# Patient Record
Sex: FEMALE | Race: BLACK OR AFRICAN AMERICAN
[De-identification: names, ages, dates, MRNs, and addresses within clinical notes are randomized per-mention and may not be internally consistent; named-entity substitution may affect disease eponyms.]

---

## 2018-11-17 NOTE — PHYS DOC
Past Medical History


Past Medical History:  No Pertinent History


Past Surgical History:  No Surgical History


Alcohol Use:  None


Drug Use:  Marijuana





Adult General


Chief Complaint


Chief Complaint:  MUSCLE SPASM/CRAMP





HPI


HPI





Patient is a 22  year old female who presents with numbness in the toes.  

Patient has been having symptoms chronically. She cannot identify any 

aggravating or alleviating factors. She describes episodes of numbness and 

cramps in the toes bilaterally with the right being worse and more frequent 

than the left. Symptoms are worse sometimes at night. Patient states she does 

wear loose shoes at baseline. She did not sustain any injury. She has no 

additional complaints over the rest of her body.





Review of Systems


Review of Systems





Constitutional: Denies fever 


Eyes: Denies change in visual acuity


HENT: Denies nasal congestion


Respiratory: Denies 


: Denies 


Musculoskeletal: Denies back pain 


Integument: Denies rash or skin lesions


Neurologic: Denies headache


Endocrine: Denies polyuria





All other systems were reviewed and found to be within normal limits, except as 

documented in this note.





Current Medications


Current Medications





Current Medications








 Medications


  (Trade)  Dose


 Ordered  Sig/Pasha  Start Time


 Stop Time Status Last Admin


Dose Admin


 


 Acetaminophen/


 Hydrocodone Bitart


  (Lortab 5/325)  2 tab  1X  ONCE  11/17/18 05:30


 11/17/18 05:31 UNV  





 


 Ibuprofen


  (Motrin)  800 mg  1X  ONCE  11/17/18 05:30


 11/17/18 05:31 UNV  














Allergies


Allergies





Allergies








Coded Allergies Type Severity Reaction Last Updated Verified


 


  No Known Drug Allergies    4/5/14 No











Physical Exam


Physical Exam





Constitutional: Well developed, well nourished, no acute distress, non-toxic 

appearance


HENT: Normocephalic, atraumatic, bilateral external ears normal, oropharynx 

moist


Eyes: PERRLA, EOMI, conjunctiva normal 


Neck: Normal range of motion  


Skin: Warm, dry, no erythema, no rash 


Back: No tenderness 


Extremities: Normal examination of the bilateral feet but + exquisite TTP b/w 

2nd and 3rd toes over plantar surface.  Sensation to light touch intact and 5/5 

motor strength of feet/toes 


Neurologic: Alert and oriented X 3


Psychologic: Affect normal





Current Patient Data


Vital Signs





 Vital Signs








  Date Time  Temp Pulse Resp B/P (MAP) Pulse Ox O2 Delivery O2 Flow Rate FiO2


 


11/17/18 05:03 98.3 101 16 148/99 (115) 95 Room Air  





 98.3       











EKG


EKG


[]





Radiology/Procedures


Radiology/Procedures


[]





Course & Med Decision Making


Course & Med Decision Making


Pertinent Labs and Imaging studies reviewed. (See chart for details)





 is evaluated for some paresthesias and cramping of the toes which has 

been chronic but was worse overnight this evening so she came to the ER. 

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 

neuroma. I cannot diagnose this in the emergency department as MRI would be 

necessary. Patient does not have insurance. She is provided a list of local 

clinics that she could try to establish care. She is advised that a podiatrist 

could possibly give some injections or further evaluate her likely diagnosis. 

She is also recommended to follow-up at the [employer] store as there are 

sometimes inserts which can be of benefit with this diagnosis. She is provided 

mode back to take 15 mg daily. She is provided some Norco to use at night only 

to help her sleep and with the pain. Patient is discharged home. She is 

agreeable to the plan of care. Opiate precautions are discussed. Return to the 

ER for any new or worsening symptoms.





Dragon Disclaimer


Dragon Disclaimer


This electronic medical record was generated, in whole or in part, using a 

voice recognition dictation system.





Departure


Departure


Impression:  


 Primary Impression:  


 Barnes neuroma


Disposition:  01 HOME, SELF-CARE


Condition:  GOOD


Patient Instructions:  Barnes's Neuroma (Interdigital Plantar Neuroma)-[facility]





Additional Instructions:  


You have a Barnes's Neuroma.  The best treatment for you is evaluation by a 

podiatrist.  You may also benefit though from going to the "[employer]" store as 

they may have some inserts/alternatives which may be of benefit to you.


Scripts


Hydrocodone/Apap 5-325 (NORCO 5-325 TABLET) 1 Each Tablet


1-2 EACH PO QHS PRN for SEVERE PAIN, #30


   as needed for pain


   Prov: RAIZA CROSS DO         11/17/18 


Meloxicam (MOBIC) 15 Mg Tablet


15 MG PO DAILY for 30 Days, #30 TAB 3 Refills


   Prov: RAIZA CROSS DO         11/17/18











RAIZA CROSS DO Nov 17, 2018 05:36

## 2019-06-09 NOTE — PHYS DOC
Past Medical History


Past Medical History:  No Pertinent History


Past Surgical History:  Other


Additional Past Surgical Histo:  RIGHT ELBOW SURG.


Alcohol Use:  None


Drug Use:  Marijuana





Adult General


Chief Complaint


Chief Complaint:  DENTAL PROBLEM





HPI


HPI





23-year-old female presents with dental pain. She states it's been progressive 

over the last several days. She states the pain is currently 10 out of 10. She 

is unable to get comfortable or even rest. She states cold makes it much 

worse.[]





Review of Systems


Review of Systems








HENT: Per history of present illness[]








All other systems were reviewed and found to be within normal limits, except as 

documented in this note.





Current Medications


Current Medications





Current Medications








 Medications


  (Trade)  Dose


 Ordered  Sig/Pasha  Start Time


 Stop Time Status Last Admin


Dose Admin


 


 Acetaminophen/


 Hydrocodone Bitart


  (Lortab 5/325)  2 tab  1X  ONCE  6/9/19 00:30


 6/9/19 00:31   














Allergies


Allergies





Allergies








Coded Allergies Type Severity Reaction Last Updated Verified


 


  No Known Drug Allergies    4/5/14 No











Physical Exam


Physical Exam





Constitutional: Well developed, well nourished, no acute distress, non-toxic 

appearance. []


HENT: Her teeth are carious but I do not appreciate any fracture no surrounding 

gingival erythema or abscess. []


Eyes: PERRLA, EOMI, conjunctiva normal, no discharge. [] 


Neck: Normal range of motion, no tenderness, supple, no stridor. [] 


Cardiovascular:Heart rate regular rhythm, no murmur []


Lungs & Thorax:  Bilateral breath sounds clear to auscultation []


Abdomen: Bowel sounds normal, soft, no tenderness, no masses, no pulsatile 

masses. [] 


Skin: Warm, dry, no erythema, no rash. [] 


Psychologic: Anxious very tearful[]





Current Patient Data


Vital Signs





                                   Vital Signs








  Date Time  Temp Pulse Resp B/P (MAP) Pulse Ox O2 Delivery O2 Flow Rate FiO2


 


6/8/19 23:44 98.1 56 18 145/94 (111) 98 Room Air  





 98.1       











EKG


EKG


[]





Radiology/Procedures


Radiology/Procedures


[]





Course & Med Decision Making


Course & Med Decision Making


Pertinent Labs and Imaging studies reviewed. (See chart for details)





[]





Dragon Disclaimer


Dragon Disclaimer


This electronic medical record was generated, in whole or in part, using a voice

 recognition dictation system.





Departure


Departure


Impression:  


   Primary Impression:  


   Pain, dental


   Additional Impression:  


   Pain due to dental caries


Disposition:  01 HOME, SELF-CARE


Condition:  STABLE


Referrals:  


NO PCP (PCP)


Patient Instructions:  Dental Caries





Additional Instructions:  


He need to follow with a dentist within the next 2-3 days.


Scripts


Penicillin V Potassium (PENICILLIN V POTASSIUM) 500 Mg Tablet


1 TAB PO QID, #40 TAB


   Prov: GARY STEVENSON DO         6/9/19 


Hydrocodone/Apap 5-325 (NORCO 5-325 TABLET) 1 Each Tablet


1 TAB PO PRN Q6HRS PRN for PAIN, #12 TAB 0 Refills


   Prov: GARY STEVENSON DO         6/9/19





Problem Qualifiers











GARY STEVENSON DO              Jun 9, 2019 00:12

## 2019-07-11 NOTE — PHYS DOC
Past Medical History


Past Medical History:  No Pertinent History


Past Surgical History:  Other


Additional Past Surgical Histo:  RIGHT ELBOW SURG.


Additional Information:  


7 CIGS/DAY


Alcohol Use:  None


Drug Use:  Marijuana





Adult General


Chief Complaint


Chief Complaint:  FACE PROBLEM





HPI


HPI





Patient is a 23  year old female who presents with right-sided throat soreness 

with right lower swollen lymph node that started today. Patient denies recent 

illness or fever. Patient rates her pain a 7 out of 10.





Review of Systems


Review of Systems





Constitutional: Denies fever or chills []


Eyes: Denies change in visual acuity, redness, or eye pain []


HENT: Denies nasal congestion. sore throat []


Respiratory: Denies cough or shortness of breath []


Cardiovascular: No additional information not addressed in HPI []


GI: Denies abdominal pain, nausea, vomiting, bloody stools or diarrhea []


: Denies dysuria or hematuria []


Musculoskeletal: Denies back pain or joint pain []


Integument: Denies rash or skin lesions []


Neurologic: Denies headache, focal weakness or sensory changes []


Endocrine: Denies polyuria or polydipsia []





All other systems were reviewed and found to be within normal limits, except as 

documented in this note.





Current Medications


Current Medications





Current Medications








 Medications


  (Trade)  Dose


 Ordered  Sig/Pasha  Start Time


 Stop Time Status Last Admin


Dose Admin


 


 Ibuprofen


  (Motrin)  600 mg  1X  ONCE  7/11/19 15:45


 7/11/19 15:46 DC 7/11/19 15:46


600 MG











Allergies


Allergies





Allergies








Coded Allergies Type Severity Reaction Last Updated Verified


 


  No Known Drug Allergies    4/5/14 No











Physical Exam


Physical Exam





Constitutional: Well developed, well nourished, no acute distress, non-toxic 

appearance. []


HENT: Normocephalic, atraumatic, bilateral external ears normal, oropharynx 

moist, no oral exudates, nose normal. Right sided submandibular lymph node 

edema.[]


Eyes: PERRLA, EOMI, conjunctiva normal, no discharge. [] 


Neck: Normal range of motion, no tenderness, supple, no stridor. [] 


Cardiovascular:Heart rate regular rhythm, no murmur []


Lungs & Thorax:  Bilateral breath sounds clear to auscultation []


Abdomen: Bowel sounds normal, soft, no tenderness, no masses, no pulsatile 

masses. [] 


Skin: Warm, dry, no erythema, no rash. [] 


Back: No tenderness, no CVA tenderness. [] 


Extremities: No tenderness, no cyanosis, no clubbing, ROM intact, no edema. [] 


Neurologic: Alert and oriented X 3, normal motor function, normal sensory 

function, no focal deficits noted. []


Psychologic: Affect normal, judgement normal, mood normal. []





Current Patient Data


Vital Signs





                                   Vital Signs








  Date Time  Temp Pulse Resp B/P (MAP) Pulse Ox O2 Delivery O2 Flow Rate FiO2


 


7/11/19 15:33 99.2 78 18 143/85 (104) 97 Room Air  





 99.2       











EKG


EKG


[]





Radiology/Procedures


Radiology/Procedures


[]





Course & Med Decision Making


Course & Med Decision Making


Patient is a 23  year old female who presents with right-sided throat soreness 

with right lower swollen lymph node that started today. Patient denies recent 

illness or fever. Patient rates her pain a 7 out of 10. Speaks in full clear 

sentences. Patient has no trouble breathing or swallowing and she is not 

drooling. Patient denies shortness of breath, chest pain, recent illness, 

nausea, vomiting, fever. Throat is reddened but there is no swelling or 

exudates. Lungs are clear to auscultation all lobes. Bilateral tympanic 

membranes are pearly white. Patient denies any nasal congestion or cough. Skin 

pink warm and dry. Mucous membranes are moist. Rapid strep is negative. Right 

sided submandibular lymph node swelling and tenderness with palpation.





I did with Dr Carreon on this patient. Patiently put on a Medrol Dosepak and to f

ollow-up with her primary care if needed. Patient is to return to the ER 

immediately if she starts having increased pain, fever, cannot swallow, 

shortness of air, drooling.





Dragon Disclaimer


Dragon Disclaimer


This electronic medical record was generated, in whole or in part, using a voice

 recognition dictation system.





Departure


Departure


Impression:  


   Primary Impression:  


   Throat pain in adult


Disposition:  01 HOME, SELF-CARE


Condition:  STABLE


Referrals:  


NO PCP (PCP)


Patient Instructions:  Sore Throat





Additional Instructions:  


Return to the ED for inability to swallow, drooling, fever, increased pain. Take

 medication as prescribed.


Scripts


Methylprednisolone (MEDROL) 4 Mg Tab.ds.pk


1 PKG PO UD, #1 PKG


   Prov: VIANNEY FUENTES APRN         7/11/19











VIANNEY FUENTES            Jul 11, 2019 16:04

## 2020-09-09 ENCOUNTER — HOSPITAL ENCOUNTER (EMERGENCY)
Dept: HOSPITAL 61 - ER | Age: 25
Discharge: HOME | End: 2020-09-09
Payer: SELF-PAY

## 2020-09-09 VITALS — HEIGHT: 66 IN | BODY MASS INDEX: 31.89 KG/M2 | WEIGHT: 198.42 LBS

## 2020-09-09 VITALS — SYSTOLIC BLOOD PRESSURE: 162 MMHG | DIASTOLIC BLOOD PRESSURE: 107 MMHG

## 2020-09-09 DIAGNOSIS — F17.200: ICD-10-CM

## 2020-09-09 DIAGNOSIS — F12.90: ICD-10-CM

## 2020-09-09 DIAGNOSIS — R20.0: ICD-10-CM

## 2020-09-09 DIAGNOSIS — M25.521: Primary | ICD-10-CM

## 2020-09-09 DIAGNOSIS — Z98.890: ICD-10-CM

## 2020-09-09 PROCEDURE — 73080 X-RAY EXAM OF ELBOW: CPT

## 2020-09-09 PROCEDURE — 99283 EMERGENCY DEPT VISIT LOW MDM: CPT

## 2020-09-09 NOTE — PHYS DOC
Past Medical History


Past Medical History:  No Pertinent History


Past Surgical History:  Other


Additional Past Surgical Histo:  RIGHT ELBOW SURG.


Smoking Status:  Current Every Day Smoker


Additional Information:  


6 CIG/DAY


Alcohol Use:  None


Drug Use:  Marijuana





General Adult


EDM:


Chief Complaint:  UPPER EXTREMITY PAIN





HPI:


HPI:





Patient is a 24  year old female who had right elbow surgery 1 year ago and for 

the last week or so she has had intermittent pain and swelling to the right 

elbow.  Patient denies any recent trauma.  Patient denies any fever.  Patient 

says the pain is worse when she wakes up in the morning.  Patient states the 

pain is localized around the olecranon and radiates outward from there.  Pain is

moderate in severity.





Review of Systems:


Review of Systems:


Constitutional:   Denies fever or chills. []


Eyes:   Denies change in visual acuity. []


HENT:   Denies nasal congestion or sore throat. [] 


Respiratory:   Denies cough or shortness of breath. [] 


Cardiovascular:   Denies chest pain or edema. [] 


GI:   Denies abdominal pain, nausea, vomiting, bloody stools or diarrhea. [] 


:  Denies dysuria. [] 


Musculoskeletal:   Denies back pain but has right right elbow pain


Integument:   Denies rash. [] 


Neurologic:   Denies headache, focal weakness or sensory changes. [] 


Endocrine:   Denies polyuria or polydipsia. [] 


Lymphatic:  Denies swollen glands. [] 


Psychiatric:  Denies depression or anxiety. []





Heart Score:


Risk Factors:


Risk Factors:  DM, Current or recent (<one month) smoker, HTN, HLP, family 

history of CAD, obesity.


Risk Scores:


Score 0 - 3:  2.5% MACE over next 6 weeks - Discharge Home


Score 4 - 6:  20.3% MACE over next 6 weeks - Admit for Clinical Observation


Score 7 - 10:  72.7% MACE over next 6 weeks - Early Invasive Strategies





Allergies:


Allergies:





Allergies








Coded Allergies Type Severity Reaction Last Updated Verified


 


  No Known Drug Allergies    14 No











Physical Exam:


PE:





Constitutional: Well developed, well nourished, no acute distress, non-toxic 

appearance. []


HENT: Normocephalic, atraumatic, bilateral external ears normal, no trismus, 

nose normal. []


Eyes: PERRLA, EOMI, conjunctiva normal, no discharge. [] 


Neck: Normal range of motion, no tenderness, supple, no stridor. [] 


Cardiovascular:Heart rate regular rhythm, peripheral pulses intact, cap refill 

brisk


Lungs & Thorax:  Bilateral breath sounds clear, no respiratory distress


Abdomen: , soft, no tenderness, no masses, no pulsatile masses. [] 


Skin: Warm, dry, no erythema, no rash. [] 


Back: No tenderness, no CVA tenderness. [] 


Extremities: Scar to the right elbow, mild tenderness with mild limited range of

 motion with minimal swelling no erythema or warmth, neurovascular intact 

distally


Neurologic: Alert and oriented X 3, normal motor function, normal sensory 

function, no focal deficits noted. []


Psychologic: Affect normal, judgement normal, mood normal. []





Current Patient Data:


Vital Signs:





                                   Vital Signs








  Date Time  Temp Pulse Resp B/P (MAP) Pulse Ox O2 Delivery O2 Flow Rate FiO2


 


20 12:29 98.2 77 18 162/107 (125) 99 Room Air  





 98.2       











EKG:


EKG:


[]





Radiology/Procedures:


Radiology/Procedures:


[][facility]


                    [address] [place] [address]


                                 [phone]


                                        


                                 IMAGING REPORT





                                     Signed





PATIENT: GINNA NESBITT RACCOUNT: [id number]     MRN#: [id number]


: 1995           LOCATION: ER              AGE: 24


SEX: F                    EXAM DT: 20         ACCESSION#: [id number]


STATUS: PRE ER            ORD. PHYSICIAN: LILLY UREÑA MD


REASON: RT POSTERIOR ELBOW PAIN X 1 WK, HX RT ELBOW SX 1 YR AGO, NO INJURY


PROCEDURE: ELBOW RIGHT 3V





3 view study right elbow


 


Clinical indications: Posterior elbow pain for one week. History of right 


elbow surgery one year ago. No recent injury.


 


FINDINGS: Metallic surgical hardware is seen within the olecranon and 


proximal ulna. No acute fracture or dislocation or lytic process is seen. 


No significant arthritic change is seen. No right elbow joint effusion is 


seen. No significant soft tissue distention of the olecranon bursa is 


seen.


 


IMPRESSION: No acute osseous abnormality.


 


Electronically signed by: Genie Peoples MD (2020 12:59 PM) [id number]














DICTATED and SIGNED BY:     GENIE PEOPLES MD


DATE:     20 [id number]





Course & Med Decision Making:


Course & Med Decision Making


Pertinent Labs and Imaging studies reviewed. (See chart for details)





[] 24-year female with right elbow pain.  Patient had surgery a year ago and 

over the last week is had increased pain and intermittent swelling.  There is no

 evidence of fracture on x-ray.  No evidence of septic joint on exam.  Patient 

possibly has bursitis.  Patient was placed on anti-inflammatories for pain.





Dragon Disclaimer:


Dragon Disclaimer:


This electronic medical record was generated, in whole or in part, using a voice

 recognition dictation system.





Departure


Departure


Impression:  


   Primary Impression:  


   Right elbow pain


Disposition:   HOME, SELF-CARE


Condition:  STABLE


Referrals:  


NO PCP (PCP)








RASHMI DELANEY II, MD


2-3 DAYS


Patient Instructions:  Bursitis





Additional Instructions:  


EMERGENCY DEPARTMENT GENERAL DISCHARGE INSTRUCTIONS





THANK YOU for coming to [facility] Emergency Department (ED) 

today and 


trusting us with your care.  We trust that you had a positive experience in our 

Emergency 


Department. If you wish to speak to the department Management you can contact 

the department 


Director at [phone].








YOUR FOLLOW UP INSTRUCTIONS ARE AS FOLLOWS: 





Do you have a private doctor? If you do not have a private doctor, please ask 

for a resource 


list of physicians or clinics that may be able to assist you with follow up 

care.  





The Emergency Physician has interpreted your x-rays. The X-ray specialist will 

also review 


them. If there is a change in the findings you will be notified in 48 hours when

 at all 


possible. 





A lab test or lab culture may have been done, your results will be reviewed and 

you will be 


notified if you need a change in treatment. 








ADDITIONAL INSTRUCTIONS AND INFORMATION 





Your care today has been supervised by a physician who is specially trained in 

emergency 


care. Many problems require more than one evaluation for a complete diagnosis 

and treatment. 


We recommend that you schedule your follow up appointment as recommended to 

ensure complete 


treatment of your illness or injury.  If you are unable to obtain follow up care

 and 


continue to have a problem, or if your condition worsens we recommend that you 

return to the 


ED. 





We are not able to safely determine your condition over the phone nor are we 

able to give 


sound medical advice over the phone. For these safety reasons, if you call for 

medical 


advice we will ask you to come to the ED for further evaluation





If you have any questions regarding these discharge instructions please call the

 ED at [phone].





SAFETY INFORMATION





In the interest of safety, wellness, and injury prevention; we encourage you to 

wear your 


seatbelt, if you smoke; quit smoking, and we encourage your family to use 

protective helmet 


for bicycling and other sporting events that present an increased risk for head 

injury. 





IF YOUR SYMPTOMS WORSEN OR NEW SYMPTOMS DEVELOP, OR YOU HAVE CONCERNS ABOUT YOUR

 CONDITION; 


OR IF YOUR CONDITION WORSENS WHILE YOU ARE WAITING FOR YOUR FOLLOW UP 

APPOINTMENT;  EITHER  


CONTACT YOUR PRIMARY CARE DOCTOR, THE PHYSICIAN WHOSE NAME AND NUMBER YOU WERE 

GIVEN,  OR 


RETURN TO THE  ED IMMEDIATELY.


Scripts


Tramadol Hcl (ULTRAM) 50 Mg Tablet


1 TAB PO PRN Q6HRS PRN for pain MDD 4 Tablet(s) for 3 Days, #12 TAB 0 Refills


   Prov: LILLY UREÑA MD         20 


Ibuprofen (IBUPROFEN) 600 Mg Tablet


600 MG PO PRN Q6HRS PRN for PAIN, #20 TAB


   take with food or milk


   Prov: LILLY UREÑA MD         20





Justicifation of Admission Dx:


Justifications for Admission:


Justification of Admission Dx:  N/A











LILLY UREÑA MD               Sep 9, 2020 12:45

## 2020-09-09 NOTE — RAD
3 view study right elbow

 

Clinical indications: Posterior elbow pain for one week. History of right 

elbow surgery one year ago. No recent injury.

 

FINDINGS: Metallic surgical hardware is seen within the olecranon and 

proximal ulna. No acute fracture or dislocation or lytic process is seen. 

No significant arthritic change is seen. No right elbow joint effusion is 

seen. No significant soft tissue distention of the olecranon bursa is 

seen.

 

IMPRESSION: No acute osseous abnormality.

 

Electronically signed by: Gerhard Peoples MD (9/9/2020 12:59 PM) [id number]